# Patient Record
Sex: MALE | Race: WHITE | NOT HISPANIC OR LATINO
[De-identification: names, ages, dates, MRNs, and addresses within clinical notes are randomized per-mention and may not be internally consistent; named-entity substitution may affect disease eponyms.]

---

## 2019-09-26 PROBLEM — Z00.00 ENCOUNTER FOR PREVENTIVE HEALTH EXAMINATION: Status: ACTIVE | Noted: 2019-09-26

## 2019-09-27 ENCOUNTER — APPOINTMENT (OUTPATIENT)
Age: 69
End: 2019-09-27
Payer: MEDICARE

## 2019-09-27 ENCOUNTER — NON-APPOINTMENT (OUTPATIENT)
Age: 69
End: 2019-09-27

## 2019-09-27 PROCEDURE — 92250 FUNDUS PHOTOGRAPHY W/I&R: CPT

## 2019-09-27 PROCEDURE — 92020 GONIOSCOPY: CPT

## 2019-09-27 PROCEDURE — 92083 EXTENDED VISUAL FIELD XM: CPT

## 2019-09-27 PROCEDURE — 92004 COMPRE OPH EXAM NEW PT 1/>: CPT

## 2019-11-14 ENCOUNTER — NON-APPOINTMENT (OUTPATIENT)
Age: 69
End: 2019-11-14

## 2019-11-14 ENCOUNTER — APPOINTMENT (OUTPATIENT)
Dept: OPHTHALMOLOGY | Facility: CLINIC | Age: 69
End: 2019-11-14
Payer: MEDICARE

## 2019-11-14 PROCEDURE — 92133 CPTRZD OPH DX IMG PST SGM ON: CPT

## 2019-11-14 PROCEDURE — 92012 INTRM OPH EXAM EST PATIENT: CPT

## 2019-11-14 PROCEDURE — 76514 ECHO EXAM OF EYE THICKNESS: CPT

## 2020-02-03 ENCOUNTER — APPOINTMENT (OUTPATIENT)
Dept: OPHTHALMOLOGY | Facility: CLINIC | Age: 70
End: 2020-02-03
Payer: MEDICARE

## 2020-02-03 ENCOUNTER — NON-APPOINTMENT (OUTPATIENT)
Age: 70
End: 2020-02-03

## 2020-02-03 PROCEDURE — 92012 INTRM OPH EXAM EST PATIENT: CPT

## 2020-02-03 PROCEDURE — 92083 EXTENDED VISUAL FIELD XM: CPT

## 2020-02-20 ENCOUNTER — APPOINTMENT (OUTPATIENT)
Dept: OPHTHALMOLOGY | Facility: CLINIC | Age: 70
End: 2020-02-20

## 2020-04-30 ENCOUNTER — APPOINTMENT (OUTPATIENT)
Dept: OPHTHALMOLOGY | Facility: CLINIC | Age: 70
End: 2020-04-30

## 2020-05-04 ENCOUNTER — APPOINTMENT (OUTPATIENT)
Age: 70
End: 2020-05-04

## 2020-05-18 ENCOUNTER — APPOINTMENT (OUTPATIENT)
Dept: OPHTHALMOLOGY | Facility: CLINIC | Age: 70
End: 2020-05-18
Payer: MEDICARE

## 2020-05-18 ENCOUNTER — NON-APPOINTMENT (OUTPATIENT)
Age: 70
End: 2020-05-18

## 2020-05-18 PROCEDURE — 99442: CPT

## 2021-01-07 ENCOUNTER — APPOINTMENT (OUTPATIENT)
Dept: OPHTHALMOLOGY | Facility: CLINIC | Age: 71
End: 2021-01-07
Payer: MEDICARE

## 2021-01-07 ENCOUNTER — NON-APPOINTMENT (OUTPATIENT)
Age: 71
End: 2021-01-07

## 2021-01-07 PROCEDURE — 92133 CPTRZD OPH DX IMG PST SGM ON: CPT

## 2021-01-07 PROCEDURE — 92083 EXTENDED VISUAL FIELD XM: CPT

## 2021-01-07 PROCEDURE — 92012 INTRM OPH EXAM EST PATIENT: CPT

## 2021-01-07 PROCEDURE — 99072 ADDL SUPL MATRL&STAF TM PHE: CPT

## 2021-01-14 ENCOUNTER — APPOINTMENT (OUTPATIENT)
Dept: OPHTHALMOLOGY | Facility: CLINIC | Age: 71
End: 2021-01-14
Payer: MEDICARE

## 2021-01-14 ENCOUNTER — NON-APPOINTMENT (OUTPATIENT)
Age: 71
End: 2021-01-14

## 2021-01-14 PROCEDURE — 99072 ADDL SUPL MATRL&STAF TM PHE: CPT

## 2021-01-14 PROCEDURE — 92012 INTRM OPH EXAM EST PATIENT: CPT

## 2021-01-29 ENCOUNTER — APPOINTMENT (OUTPATIENT)
Dept: OPHTHALMOLOGY | Facility: CLINIC | Age: 71
End: 2021-01-29
Payer: MEDICARE

## 2021-01-29 ENCOUNTER — NON-APPOINTMENT (OUTPATIENT)
Age: 71
End: 2021-01-29

## 2021-01-29 PROCEDURE — 92012 INTRM OPH EXAM EST PATIENT: CPT

## 2021-01-29 PROCEDURE — 99072 ADDL SUPL MATRL&STAF TM PHE: CPT

## 2021-02-02 ENCOUNTER — APPOINTMENT (OUTPATIENT)
Dept: OPHTHALMOLOGY | Facility: AMBULATORY SURGERY CENTER | Age: 71
End: 2021-02-02

## 2021-02-03 ENCOUNTER — APPOINTMENT (OUTPATIENT)
Dept: OPHTHALMOLOGY | Facility: CLINIC | Age: 71
End: 2021-02-03

## 2021-03-08 ENCOUNTER — APPOINTMENT (OUTPATIENT)
Dept: OPHTHALMOLOGY | Facility: CLINIC | Age: 71
End: 2021-03-08
Payer: MEDICARE

## 2021-03-08 ENCOUNTER — NON-APPOINTMENT (OUTPATIENT)
Age: 71
End: 2021-03-08

## 2021-03-08 PROCEDURE — 92012 INTRM OPH EXAM EST PATIENT: CPT

## 2021-03-08 PROCEDURE — 99072 ADDL SUPL MATRL&STAF TM PHE: CPT

## 2021-05-07 ENCOUNTER — APPOINTMENT (OUTPATIENT)
Dept: OPHTHALMOLOGY | Facility: CLINIC | Age: 71
End: 2021-05-07

## 2021-08-19 ENCOUNTER — NON-APPOINTMENT (OUTPATIENT)
Age: 71
End: 2021-08-19

## 2021-08-19 ENCOUNTER — APPOINTMENT (OUTPATIENT)
Dept: OPHTHALMOLOGY | Facility: CLINIC | Age: 71
End: 2021-08-19
Payer: MEDICARE

## 2021-08-19 PROCEDURE — 92020 GONIOSCOPY: CPT

## 2021-08-19 PROCEDURE — 92250 FUNDUS PHOTOGRAPHY W/I&R: CPT

## 2021-08-19 PROCEDURE — 92083 EXTENDED VISUAL FIELD XM: CPT

## 2021-08-19 PROCEDURE — 92014 COMPRE OPH EXAM EST PT 1/>: CPT

## 2021-09-24 ENCOUNTER — APPOINTMENT (OUTPATIENT)
Dept: OPHTHALMOLOGY | Facility: CLINIC | Age: 71
End: 2021-09-24
Payer: MEDICARE

## 2021-09-24 ENCOUNTER — NON-APPOINTMENT (OUTPATIENT)
Age: 71
End: 2021-09-24

## 2021-09-24 PROCEDURE — 92012 INTRM OPH EXAM EST PATIENT: CPT

## 2021-11-12 ENCOUNTER — APPOINTMENT (OUTPATIENT)
Dept: OPHTHALMOLOGY | Facility: CLINIC | Age: 71
End: 2021-11-12

## 2021-12-06 ENCOUNTER — NON-APPOINTMENT (OUTPATIENT)
Age: 71
End: 2021-12-06

## 2021-12-06 ENCOUNTER — APPOINTMENT (OUTPATIENT)
Dept: OPHTHALMOLOGY | Facility: CLINIC | Age: 71
End: 2021-12-06
Payer: MEDICARE

## 2021-12-06 PROCEDURE — 92012 INTRM OPH EXAM EST PATIENT: CPT

## 2021-12-06 PROCEDURE — 92083 EXTENDED VISUAL FIELD XM: CPT

## 2021-12-06 PROCEDURE — 92133 CPTRZD OPH DX IMG PST SGM ON: CPT

## 2021-12-16 ENCOUNTER — APPOINTMENT (OUTPATIENT)
Dept: OPHTHALMOLOGY | Facility: CLINIC | Age: 71
End: 2021-12-16

## 2022-03-10 ENCOUNTER — APPOINTMENT (OUTPATIENT)
Dept: OPHTHALMOLOGY | Facility: CLINIC | Age: 72
End: 2022-03-10
Payer: MEDICARE

## 2022-03-10 ENCOUNTER — NON-APPOINTMENT (OUTPATIENT)
Age: 72
End: 2022-03-10

## 2022-03-10 PROCEDURE — 92134 CPTRZ OPH DX IMG PST SGM RTA: CPT

## 2022-03-10 PROCEDURE — 92012 INTRM OPH EXAM EST PATIENT: CPT

## 2022-03-10 PROCEDURE — 92083 EXTENDED VISUAL FIELD XM: CPT

## 2022-07-28 ENCOUNTER — APPOINTMENT (OUTPATIENT)
Dept: OPHTHALMOLOGY | Facility: CLINIC | Age: 72
End: 2022-07-28

## 2022-07-28 ENCOUNTER — NON-APPOINTMENT (OUTPATIENT)
Age: 72
End: 2022-07-28

## 2022-07-28 PROCEDURE — 92083 EXTENDED VISUAL FIELD XM: CPT

## 2022-07-28 PROCEDURE — 92014 COMPRE OPH EXAM EST PT 1/>: CPT

## 2022-07-28 PROCEDURE — 92250 FUNDUS PHOTOGRAPHY W/I&R: CPT

## 2022-12-19 ENCOUNTER — NON-APPOINTMENT (OUTPATIENT)
Age: 72
End: 2022-12-19

## 2022-12-19 ENCOUNTER — APPOINTMENT (OUTPATIENT)
Dept: OPHTHALMOLOGY | Facility: CLINIC | Age: 72
End: 2022-12-19
Payer: MEDICARE

## 2022-12-19 PROCEDURE — 92012 INTRM OPH EXAM EST PATIENT: CPT

## 2022-12-19 PROCEDURE — 92133 CPTRZD OPH DX IMG PST SGM ON: CPT

## 2023-05-01 ENCOUNTER — NON-APPOINTMENT (OUTPATIENT)
Age: 73
End: 2023-05-01

## 2023-05-01 ENCOUNTER — APPOINTMENT (OUTPATIENT)
Dept: OPHTHALMOLOGY | Facility: CLINIC | Age: 73
End: 2023-05-01
Payer: MEDICARE

## 2023-05-01 PROCEDURE — 92012 INTRM OPH EXAM EST PATIENT: CPT

## 2023-05-01 PROCEDURE — 92083 EXTENDED VISUAL FIELD XM: CPT

## 2023-05-01 PROCEDURE — 92133 CPTRZD OPH DX IMG PST SGM ON: CPT

## 2023-09-11 ENCOUNTER — NON-APPOINTMENT (OUTPATIENT)
Age: 73
End: 2023-09-11

## 2023-09-11 ENCOUNTER — APPOINTMENT (OUTPATIENT)
Dept: OPHTHALMOLOGY | Facility: CLINIC | Age: 73
End: 2023-09-11
Payer: MEDICARE

## 2023-09-11 PROCEDURE — 92012 INTRM OPH EXAM EST PATIENT: CPT

## 2023-09-11 PROCEDURE — 92083 EXTENDED VISUAL FIELD XM: CPT

## 2023-12-19 VITALS
RESPIRATION RATE: 16 BRPM | DIASTOLIC BLOOD PRESSURE: 65 MMHG | OXYGEN SATURATION: 100 % | HEIGHT: 64 IN | WEIGHT: 130.07 LBS | TEMPERATURE: 98 F | SYSTOLIC BLOOD PRESSURE: 149 MMHG | HEART RATE: 54 BPM

## 2023-12-19 RX ORDER — DEXTROSE 50 % IN WATER 50 %
25 SYRINGE (ML) INTRAVENOUS ONCE
Refills: 0 | Status: DISCONTINUED | OUTPATIENT
Start: 2023-12-20 | End: 2024-01-03

## 2023-12-19 RX ORDER — DEXTROSE 50 % IN WATER 50 %
12.5 SYRINGE (ML) INTRAVENOUS ONCE
Refills: 0 | Status: DISCONTINUED | OUTPATIENT
Start: 2023-12-20 | End: 2024-01-03

## 2023-12-19 RX ORDER — SODIUM CHLORIDE 9 MG/ML
1000 INJECTION, SOLUTION INTRAVENOUS
Refills: 0 | Status: DISCONTINUED | OUTPATIENT
Start: 2023-12-20 | End: 2024-01-03

## 2023-12-19 RX ORDER — DEXTROSE 50 % IN WATER 50 %
15 SYRINGE (ML) INTRAVENOUS ONCE
Refills: 0 | Status: DISCONTINUED | OUTPATIENT
Start: 2023-12-20 | End: 2024-01-03

## 2023-12-19 RX ORDER — INSULIN LISPRO 100/ML
VIAL (ML) SUBCUTANEOUS
Refills: 0 | Status: DISCONTINUED | OUTPATIENT
Start: 2023-12-20 | End: 2024-01-03

## 2023-12-19 RX ORDER — GLUCAGON INJECTION, SOLUTION 0.5 MG/.1ML
1 INJECTION, SOLUTION SUBCUTANEOUS ONCE
Refills: 0 | Status: DISCONTINUED | OUTPATIENT
Start: 2023-12-20 | End: 2024-01-03

## 2023-12-19 NOTE — H&P ADULT - NSHPLABSRESULTS_GEN_ALL_CORE
12.8   6.83  )-----------( 224      ( 20 Dec 2023 07:06 )             38.2       12-20    133<L>  |  99  |  28<H>  ----------------------------<  107<H>  3.4<L>   |  25  |  1.06    Ca    9.5      20 Dec 2023 07:06  Mg     1.8     12-20    TPro  7.3  /  Alb  4.4  /  TBili  0.4  /  DBili  x   /  AST  20  /  ALT  15  /  AlkPhos  100  12-20      PT/INR - ( 20 Dec 2023 07:06 )   PT: 11.9 sec;   INR: 1.05          PTT - ( 20 Dec 2023 07:06 )  PTT:33.0 sec    CARDIAC MARKERS ( 20 Dec 2023 07:06 )  x     / x     / 100 U/L / x     / 3.5 ng/mL            EKG: SB at 54 bpm with 1st degree AV block. RBBB. TWI III.

## 2023-12-19 NOTE — H&P ADULT - ASSESSMENT
73 year old Azeri/English speaking M with PMHx HTN, Hyperlipidemia, DM Type II, PVD (on Xarelto 2.5 mg PO BID-last dose 12/19/23), RBBB, Multilevel Spondylisis with left arm weakness, Bilateral Carotid Artery Stenosis (severe left and mdoerate right) s/p Left CEA 2/2017, CAD s/p 2V CABG (LIMA-LAD and SVG-OM1) ~ 2006 with subsequent PCI pLAD due to occlusion LIMA-LAD, SKYLER (on CPAP) who presents for cardiac cath due to patient's risk factors, CCS Angina Class III Symptoms and abnormal NST.    ASA III          Mallampati III  Patient is a candidate for sedation: yes  Sedation: Moderate    Risks & benefits of procedure and alternative therapy have been explained to the patient including but not limited to: allergic reaction, bleeding w/possible need for blood transfusion, infection, renal and vascular compromise, limb damage, arrhythmia, stroke, vessel dissection/perforation, Myocardial infarction, emergent CABG. Informed consent obtained and in chart.   Consent obtained using Azeri  [employer] ID #[id number]    Hgb/HCT 12.8/38.2 consistent with prior outpatient labs December 2023. Patient denies bleeding, BRBPR, melena,  hematemesis. Patient reports hematuria 2-3 weeks ago where he was treated at [facility] and Hematuria has resolved ? UTI. Patient us currently on Aspirin 81 mg daily and reports compliance last dose 12/19/23. ASA EC 81 mg PO x 1 and Plavix 600 mg PO x 1 given prior to procedure. Xarelto 2.5 mg last dose 12/19/23  aware. INR 1.3     EF normal by echo 10/25/23. Patient euvolemic on exam with no history of CHF.  cc Bolus IV x 1 followed by NS 75 cc/hr x 2 hrs per Hydration Protocol.     K +3.4 Kdur 40 mEq PO x 1 given prior to procedure. Magnesium 1.8-Mag Ox 800 mg PO x 1 given prior to procedure.    73 year old Japanese/English speaking M with PMHx HTN, Hyperlipidemia, DM Type II, PVD (on Xarelto 2.5 mg PO BID-last dose 12/19/23), RBBB, Multilevel Spondylisis with left arm weakness, Bilateral Carotid Artery Stenosis (severe left and mdoerate right) s/p Left CEA 2/2017, CAD s/p 2V CABG (LIMA-LAD and SVG-OM1) ~ 2006 with subsequent PCI pLAD due to occlusion LIMA-LAD, SKYLER (on CPAP) who presents for cardiac cath due to patient's risk factors, CCS Angina Class III Symptoms and abnormal NST.    ASA III          Mallampati III  Patient is a candidate for sedation: yes  Sedation: Moderate    Risks & benefits of procedure and alternative therapy have been explained to the patient including but not limited to: allergic reaction, bleeding w/possible need for blood transfusion, infection, renal and vascular compromise, limb damage, arrhythmia, stroke, vessel dissection/perforation, Myocardial infarction, emergent CABG. Informed consent obtained and in chart.   Consent obtained using Japanese  [employer] ID #[id number]    Hgb/HCT 12.8/38.2 consistent with prior outpatient labs December 2023. Patient denies bleeding, BRBPR, melena,  hematemesis. Patient reports hematuria 2-3 weeks ago where he was treated at [facility] and Hematuria has resolved ? UTI. Patient us currently on Aspirin 81 mg daily and reports compliance last dose 12/19/23. ASA EC 81 mg PO x 1 and Plavix 600 mg PO x 1 given prior to procedure. Xarelto 2.5 mg last dose 12/19/23  aware. INR 1.3     EF normal by echo 10/25/23. Patient euvolemic on exam with no history of CHF.  cc Bolus IV x 1 followed by NS 75 cc/hr x 2 hrs per Hydration Protocol.     K +3.4 Kdur 40 mEq PO x 1 given prior to procedure. Magnesium 1.8-Mag Ox 800 mg PO x 1 given prior to procedure.

## 2023-12-19 NOTE — H&P ADULT - NSICDXPASTMEDICALHX_GEN_ALL_CORE_FT
PAST MEDICAL HISTORY:  CAD (coronary artery disease)     Carotid artery stenosis     Hyperlipidemia     Hypertension     Type 2 diabetes mellitus

## 2023-12-19 NOTE — H&P ADULT - VTE RISK ASSESSMENT
Roderick Galloway 
VTE Assessment already completed for this visit

## 2023-12-19 NOTE — H&P ADULT - HISTORY OF PRESENT ILLNESS
Cardiologist: Dr. Zia Uriarte   Pharmacy:    Escort:     Confirm meds    73 year old M with PMHx HTN, Hyperlipidemia, DM Type II, PVD (on Xarelto 2.5 mg PO BID), RBBB, Multilevel Spondylisis with left arm weakness, Bilateral Carotid Artery Stenosis (severe left and mdoerate right) s/p Left CEA 2/2017, CAD s/p 2V CABG (LIMA-LAD and SVG-OM1) ~ 2006 with subsequent PCI pLAD due to occlusion LIMA-LAD, SKYLER (on CPAP) who presented to cardiologist c/o intermittent non-radiating substernal exertional chest pressure x few weeks. C/P has been gradually worsening and symptoms occur when walking less than 2 blocks or climbing less than 1 flight of stairs (CCS Angina Class III Symptoms). He also reports dizziness and claudication with 1-3 blocks or climbing 1-2 flights of stairs. Patient denies SOB, palpitations, diaphoresis, N/V, syncope, LE edema, PND or orthopnea, fever, chills, cough, BRBPR, melena. Nuclear Stress Test 10/28/23 revealed small to moderate amount of ischemia in the inferior region , post stress LVEF 72%, normal wall motion, no TID. Stress EKG negative.      Echocardiogram 10/25/23 revealed LVEF 60%, normal wall motion, mild MR, no pulmonary HTN, no pericardial effusion. In light of patient’s risk factors, CCS Angina Class III Symptoms, known CAD and abnormal NST patient now presents for cardiac cath with possible intervention if clinically indicated. Cardiologist: Dr. Zia Uriarte   Pharmacy:  [facility] (in system)-Medications obtained from medication bottles and Surescripts  Escort: Son    73 year old Welsh/English speaking M with PMHx HTN, Hyperlipidemia, DM Type II, PVD (on Xarelto 2.5 mg PO BID--last dose 12/19/23), RBBB, Multilevel Spondylisis with left arm weakness, Bilateral Carotid Artery Stenosis (severe left and mdoerate right) s/p Left CEA 2/2017, CAD s/p 2V CABG (LIMA-LAD and SVG-OM1) ~ 2006 with subsequent PCI pLAD due to occlusion LIMA-LAD, SKYLER (on CPAP) who presented to cardiologist c/o intermittent non-radiating substernal exertional chest pressure x few weeks. C/P has been gradually worsening and symptoms occur when walking less than 2 blocks or climbing less than 1 flight of stairs (CCS Angina Class III Symptoms). He also reports dizziness and claudication with 1-3 blocks or climbing 1-2 flights of stairs. Patient denies SOB, palpitations, diaphoresis, N/V, syncope, LE edema, PND or orthopnea, fever, chills, cough, BRBPR, melena. Nuclear Stress Test 10/28/23 revealed small to moderate amount of ischemia in the inferior region , post stress LVEF 72%, normal wall motion, no TID. Stress EKG negative.      Echocardiogram 10/25/23 revealed LVEF 60%, normal wall motion, mild MR, no pulmonary HTN, no pericardial effusion. In light of patient’s risk factors, CCS Angina Class III Symptoms, known CAD and abnormal NST patient now presents for cardiac cath with possible intervention if clinically indicated. Cardiologist: Dr. Zia Uriarte   Pharmacy:  [facility] (in system)-Medications obtained from medication bottles and Surescripts  Escort: Son    73 year old Georgian/English speaking M with PMHx HTN, Hyperlipidemia, DM Type II, PVD (on Xarelto 2.5 mg PO BID--last dose 12/19/23), RBBB, Multilevel Spondylisis with left arm weakness, Bilateral Carotid Artery Stenosis (severe left and mdoerate right) s/p Left CEA 2/2017, CAD s/p 2V CABG (LIMA-LAD and SVG-OM1) ~ 2006 with subsequent PCI pLAD due to occlusion LIMA-LAD, SKYLER (on CPAP) who presented to cardiologist c/o intermittent non-radiating substernal exertional chest pressure x few weeks. C/P has been gradually worsening and symptoms occur when walking less than 2 blocks or climbing less than 1 flight of stairs (CCS Angina Class III Symptoms). He also reports dizziness and claudication with 1-3 blocks or climbing 1-2 flights of stairs. Patient denies SOB, palpitations, diaphoresis, N/V, syncope, LE edema, PND or orthopnea, fever, chills, cough, BRBPR, melena. Nuclear Stress Test 10/28/23 revealed small to moderate amount of ischemia in the inferior region , post stress LVEF 72%, normal wall motion, no TID. Stress EKG negative.      Echocardiogram 10/25/23 revealed LVEF 60%, normal wall motion, mild MR, no pulmonary HTN, no pericardial effusion. In light of patient’s risk factors, CCS Angina Class III Symptoms, known CAD and abnormal NST patient now presents for cardiac cath with possible intervention if clinically indicated.

## 2023-12-20 ENCOUNTER — OUTPATIENT (OUTPATIENT)
Dept: OUTPATIENT SERVICES | Facility: HOSPITAL | Age: 73
LOS: 1 days | Discharge: HOME | End: 2023-12-20
Payer: MEDICARE

## 2023-12-20 LAB
A1C WITH ESTIMATED AVERAGE GLUCOSE RESULT: 7.1 % — HIGH (ref 4–5.6)
A1C WITH ESTIMATED AVERAGE GLUCOSE RESULT: 7.1 % — HIGH (ref 4–5.6)
ALBUMIN SERPL ELPH-MCNC: 4.4 G/DL — SIGNIFICANT CHANGE UP (ref 3.3–5)
ALBUMIN SERPL ELPH-MCNC: 4.4 G/DL — SIGNIFICANT CHANGE UP (ref 3.3–5)
ALP SERPL-CCNC: 100 U/L — SIGNIFICANT CHANGE UP (ref 40–120)
ALP SERPL-CCNC: 100 U/L — SIGNIFICANT CHANGE UP (ref 40–120)
ALT FLD-CCNC: 15 U/L — SIGNIFICANT CHANGE UP (ref 10–45)
ALT FLD-CCNC: 15 U/L — SIGNIFICANT CHANGE UP (ref 10–45)
ANION GAP SERPL CALC-SCNC: 10 MMOL/L — SIGNIFICANT CHANGE UP (ref 5–17)
ANION GAP SERPL CALC-SCNC: 10 MMOL/L — SIGNIFICANT CHANGE UP (ref 5–17)
ANION GAP SERPL CALC-SCNC: 9 MMOL/L — SIGNIFICANT CHANGE UP (ref 5–17)
ANION GAP SERPL CALC-SCNC: 9 MMOL/L — SIGNIFICANT CHANGE UP (ref 5–17)
APTT BLD: 33 SEC — SIGNIFICANT CHANGE UP (ref 24.5–35.6)
APTT BLD: 33 SEC — SIGNIFICANT CHANGE UP (ref 24.5–35.6)
AST SERPL-CCNC: 20 U/L — SIGNIFICANT CHANGE UP (ref 10–40)
AST SERPL-CCNC: 20 U/L — SIGNIFICANT CHANGE UP (ref 10–40)
BASOPHILS # BLD AUTO: 0.07 K/UL — SIGNIFICANT CHANGE UP (ref 0–0.2)
BASOPHILS # BLD AUTO: 0.07 K/UL — SIGNIFICANT CHANGE UP (ref 0–0.2)
BASOPHILS NFR BLD AUTO: 1 % — SIGNIFICANT CHANGE UP (ref 0–2)
BASOPHILS NFR BLD AUTO: 1 % — SIGNIFICANT CHANGE UP (ref 0–2)
BILIRUB SERPL-MCNC: 0.4 MG/DL — SIGNIFICANT CHANGE UP (ref 0.2–1.2)
BILIRUB SERPL-MCNC: 0.4 MG/DL — SIGNIFICANT CHANGE UP (ref 0.2–1.2)
BLOOD GAS VENOUS - CREATININE: 1.1 MG/DL — SIGNIFICANT CHANGE UP (ref 0.5–1.3)
BUN SERPL-MCNC: 24 MG/DL — HIGH (ref 7–23)
BUN SERPL-MCNC: 24 MG/DL — HIGH (ref 7–23)
BUN SERPL-MCNC: 28 MG/DL — HIGH (ref 7–23)
BUN SERPL-MCNC: 28 MG/DL — HIGH (ref 7–23)
CA-I SERPL-SCNC: 1.25 MMOL/L — SIGNIFICANT CHANGE UP (ref 1.15–1.33)
CALCIUM SERPL-MCNC: 8.5 MG/DL — SIGNIFICANT CHANGE UP (ref 8.4–10.5)
CALCIUM SERPL-MCNC: 8.5 MG/DL — SIGNIFICANT CHANGE UP (ref 8.4–10.5)
CALCIUM SERPL-MCNC: 9.5 MG/DL — SIGNIFICANT CHANGE UP (ref 8.4–10.5)
CALCIUM SERPL-MCNC: 9.5 MG/DL — SIGNIFICANT CHANGE UP (ref 8.4–10.5)
CHLORIDE BLDV-SCNC: 102 MMOL/L — SIGNIFICANT CHANGE UP (ref 96–108)
CHLORIDE SERPL-SCNC: 103 MMOL/L — SIGNIFICANT CHANGE UP (ref 96–108)
CHLORIDE SERPL-SCNC: 103 MMOL/L — SIGNIFICANT CHANGE UP (ref 96–108)
CHLORIDE SERPL-SCNC: 99 MMOL/L — SIGNIFICANT CHANGE UP (ref 96–108)
CHLORIDE SERPL-SCNC: 99 MMOL/L — SIGNIFICANT CHANGE UP (ref 96–108)
CHOLEST SERPL-MCNC: 152 MG/DL — SIGNIFICANT CHANGE UP
CHOLEST SERPL-MCNC: 152 MG/DL — SIGNIFICANT CHANGE UP
CK MB CFR SERPL CALC: 3.5 NG/ML — SIGNIFICANT CHANGE UP (ref 0–6.7)
CK MB CFR SERPL CALC: 3.5 NG/ML — SIGNIFICANT CHANGE UP (ref 0–6.7)
CK SERPL-CCNC: 100 U/L — SIGNIFICANT CHANGE UP (ref 30–200)
CK SERPL-CCNC: 100 U/L — SIGNIFICANT CHANGE UP (ref 30–200)
CO2 BLDV-SCNC: 22.2 MMOL/L — SIGNIFICANT CHANGE UP (ref 22–26)
CO2 SERPL-SCNC: 20 MMOL/L — LOW (ref 22–31)
CO2 SERPL-SCNC: 20 MMOL/L — LOW (ref 22–31)
CO2 SERPL-SCNC: 25 MMOL/L — SIGNIFICANT CHANGE UP (ref 22–31)
CO2 SERPL-SCNC: 25 MMOL/L — SIGNIFICANT CHANGE UP (ref 22–31)
CREAT SERPL-MCNC: 0.9 MG/DL — SIGNIFICANT CHANGE UP (ref 0.5–1.3)
CREAT SERPL-MCNC: 0.9 MG/DL — SIGNIFICANT CHANGE UP (ref 0.5–1.3)
CREAT SERPL-MCNC: 1.06 MG/DL — SIGNIFICANT CHANGE UP (ref 0.5–1.3)
CREAT SERPL-MCNC: 1.06 MG/DL — SIGNIFICANT CHANGE UP (ref 0.5–1.3)
EGFR: 75 ML/MIN/1.73M2 — SIGNIFICANT CHANGE UP
EGFR: 75 ML/MIN/1.73M2 — SIGNIFICANT CHANGE UP
EGFR: 91 ML/MIN/1.73M2 — SIGNIFICANT CHANGE UP
EGFR: 91 ML/MIN/1.73M2 — SIGNIFICANT CHANGE UP
EOSINOPHIL # BLD AUTO: 0.43 K/UL — SIGNIFICANT CHANGE UP (ref 0–0.5)
EOSINOPHIL # BLD AUTO: 0.43 K/UL — SIGNIFICANT CHANGE UP (ref 0–0.5)
EOSINOPHIL NFR BLD AUTO: 6.3 % — HIGH (ref 0–6)
EOSINOPHIL NFR BLD AUTO: 6.3 % — HIGH (ref 0–6)
ESTIMATED AVERAGE GLUCOSE: 157 MG/DL — HIGH (ref 68–114)
ESTIMATED AVERAGE GLUCOSE: 157 MG/DL — HIGH (ref 68–114)
GLUCOSE BLDC GLUCOMTR-MCNC: 104 MG/DL — HIGH (ref 70–99)
GLUCOSE BLDC GLUCOMTR-MCNC: 104 MG/DL — HIGH (ref 70–99)
GLUCOSE BLDC GLUCOMTR-MCNC: 97 MG/DL — SIGNIFICANT CHANGE UP (ref 70–99)
GLUCOSE BLDC GLUCOMTR-MCNC: 97 MG/DL — SIGNIFICANT CHANGE UP (ref 70–99)
GLUCOSE BLDV-MCNC: 111 MG/DL — HIGH (ref 70–99)
GLUCOSE SERPL-MCNC: 107 MG/DL — HIGH (ref 70–99)
GLUCOSE SERPL-MCNC: 107 MG/DL — HIGH (ref 70–99)
GLUCOSE SERPL-MCNC: 144 MG/DL — HIGH (ref 70–99)
GLUCOSE SERPL-MCNC: 144 MG/DL — HIGH (ref 70–99)
HCT VFR BLD CALC: 34.5 % — LOW (ref 39–50)
HCT VFR BLD CALC: 34.5 % — LOW (ref 39–50)
HCT VFR BLD CALC: 38.2 % — LOW (ref 39–50)
HCT VFR BLD CALC: 38.2 % — LOW (ref 39–50)
HDLC SERPL-MCNC: 63 MG/DL — SIGNIFICANT CHANGE UP
HDLC SERPL-MCNC: 63 MG/DL — SIGNIFICANT CHANGE UP
HGB BLD-MCNC: 11.7 G/DL — LOW (ref 13–17)
HGB BLD-MCNC: 11.7 G/DL — LOW (ref 13–17)
HGB BLD-MCNC: 12.8 G/DL — LOW (ref 13–17)
HGB BLD-MCNC: 12.8 G/DL — LOW (ref 13–17)
IMM GRANULOCYTES NFR BLD AUTO: 0.1 % — SIGNIFICANT CHANGE UP (ref 0–0.9)
IMM GRANULOCYTES NFR BLD AUTO: 0.1 % — SIGNIFICANT CHANGE UP (ref 0–0.9)
INR BLD: 1.05 — SIGNIFICANT CHANGE UP (ref 0.85–1.18)
INR BLD: 1.05 — SIGNIFICANT CHANGE UP (ref 0.85–1.18)
ISTAT ACTK (ACTIVATED CLOTTING TIME KAOLIN): 277 SEC — HIGH (ref 74–137)
ISTAT ACTK (ACTIVATED CLOTTING TIME KAOLIN): 277 SEC — HIGH (ref 74–137)
ISTAT INR: 1.3 — HIGH (ref 0.88–1.16)
ISTAT INR: 1.3 — HIGH (ref 0.88–1.16)
ISTAT PT: 15.6 SEC — HIGH (ref 10–12.9)
ISTAT PT: 15.6 SEC — HIGH (ref 10–12.9)
LACTATE BLDV-MCNC: 1.4 MMOL/L — SIGNIFICANT CHANGE UP (ref 0.5–2)
LIPID PNL WITH DIRECT LDL SERPL: 54 MG/DL — SIGNIFICANT CHANGE UP
LIPID PNL WITH DIRECT LDL SERPL: 54 MG/DL — SIGNIFICANT CHANGE UP
LYMPHOCYTES # BLD AUTO: 1.59 K/UL — SIGNIFICANT CHANGE UP (ref 1–3.3)
LYMPHOCYTES # BLD AUTO: 1.59 K/UL — SIGNIFICANT CHANGE UP (ref 1–3.3)
LYMPHOCYTES # BLD AUTO: 23.3 % — SIGNIFICANT CHANGE UP (ref 13–44)
LYMPHOCYTES # BLD AUTO: 23.3 % — SIGNIFICANT CHANGE UP (ref 13–44)
MAGNESIUM SERPL-MCNC: 1.8 MG/DL — SIGNIFICANT CHANGE UP (ref 1.6–2.6)
MCHC RBC-ENTMCNC: 27.9 PG — SIGNIFICANT CHANGE UP (ref 27–34)
MCHC RBC-ENTMCNC: 27.9 PG — SIGNIFICANT CHANGE UP (ref 27–34)
MCHC RBC-ENTMCNC: 28.3 PG — SIGNIFICANT CHANGE UP (ref 27–34)
MCHC RBC-ENTMCNC: 28.3 PG — SIGNIFICANT CHANGE UP (ref 27–34)
MCHC RBC-ENTMCNC: 33.5 GM/DL — SIGNIFICANT CHANGE UP (ref 32–36)
MCHC RBC-ENTMCNC: 33.5 GM/DL — SIGNIFICANT CHANGE UP (ref 32–36)
MCHC RBC-ENTMCNC: 33.9 GM/DL — SIGNIFICANT CHANGE UP (ref 32–36)
MCHC RBC-ENTMCNC: 33.9 GM/DL — SIGNIFICANT CHANGE UP (ref 32–36)
MCV RBC AUTO: 83.3 FL — SIGNIFICANT CHANGE UP (ref 80–100)
MCV RBC AUTO: 83.3 FL — SIGNIFICANT CHANGE UP (ref 80–100)
MCV RBC AUTO: 83.4 FL — SIGNIFICANT CHANGE UP (ref 80–100)
MCV RBC AUTO: 83.4 FL — SIGNIFICANT CHANGE UP (ref 80–100)
MONOCYTES # BLD AUTO: 0.7 K/UL — SIGNIFICANT CHANGE UP (ref 0–0.9)
MONOCYTES # BLD AUTO: 0.7 K/UL — SIGNIFICANT CHANGE UP (ref 0–0.9)
MONOCYTES NFR BLD AUTO: 10.2 % — SIGNIFICANT CHANGE UP (ref 2–14)
MONOCYTES NFR BLD AUTO: 10.2 % — SIGNIFICANT CHANGE UP (ref 2–14)
NEUTROPHILS # BLD AUTO: 4.03 K/UL — SIGNIFICANT CHANGE UP (ref 1.8–7.4)
NEUTROPHILS # BLD AUTO: 4.03 K/UL — SIGNIFICANT CHANGE UP (ref 1.8–7.4)
NEUTROPHILS NFR BLD AUTO: 59.1 % — SIGNIFICANT CHANGE UP (ref 43–77)
NEUTROPHILS NFR BLD AUTO: 59.1 % — SIGNIFICANT CHANGE UP (ref 43–77)
NON HDL CHOLESTEROL: 89 MG/DL — SIGNIFICANT CHANGE UP
NON HDL CHOLESTEROL: 89 MG/DL — SIGNIFICANT CHANGE UP
NRBC # BLD: 0 /100 WBCS — SIGNIFICANT CHANGE UP (ref 0–0)
PCO2 BLDV: 51 MMHG — SIGNIFICANT CHANGE UP (ref 42–55)
PH BLDV: 7.33 — SIGNIFICANT CHANGE UP (ref 7.32–7.43)
PLATELET # BLD AUTO: 197 K/UL — SIGNIFICANT CHANGE UP (ref 150–400)
PLATELET # BLD AUTO: 197 K/UL — SIGNIFICANT CHANGE UP (ref 150–400)
PLATELET # BLD AUTO: 224 K/UL — SIGNIFICANT CHANGE UP (ref 150–400)
PLATELET # BLD AUTO: 224 K/UL — SIGNIFICANT CHANGE UP (ref 150–400)
POTASSIUM BLDV-SCNC: 3.7 MMOL/L — SIGNIFICANT CHANGE UP (ref 3.5–5.1)
POTASSIUM SERPL-MCNC: 3.4 MMOL/L — LOW (ref 3.5–5.3)
POTASSIUM SERPL-MCNC: 3.4 MMOL/L — LOW (ref 3.5–5.3)
POTASSIUM SERPL-MCNC: 4.2 MMOL/L — SIGNIFICANT CHANGE UP (ref 3.5–5.3)
POTASSIUM SERPL-MCNC: 4.2 MMOL/L — SIGNIFICANT CHANGE UP (ref 3.5–5.3)
POTASSIUM SERPL-SCNC: 3.4 MMOL/L — LOW (ref 3.5–5.3)
POTASSIUM SERPL-SCNC: 3.4 MMOL/L — LOW (ref 3.5–5.3)
POTASSIUM SERPL-SCNC: 4.2 MMOL/L — SIGNIFICANT CHANGE UP (ref 3.5–5.3)
POTASSIUM SERPL-SCNC: 4.2 MMOL/L — SIGNIFICANT CHANGE UP (ref 3.5–5.3)
PROT SERPL-MCNC: 7.3 G/DL — SIGNIFICANT CHANGE UP (ref 6–8.3)
PROT SERPL-MCNC: 7.3 G/DL — SIGNIFICANT CHANGE UP (ref 6–8.3)
PROTHROM AB SERPL-ACNC: 11.9 SEC — SIGNIFICANT CHANGE UP (ref 9.5–13)
PROTHROM AB SERPL-ACNC: 11.9 SEC — SIGNIFICANT CHANGE UP (ref 9.5–13)
RBC # BLD: 4.14 M/UL — LOW (ref 4.2–5.8)
RBC # BLD: 4.14 M/UL — LOW (ref 4.2–5.8)
RBC # BLD: 4.58 M/UL — SIGNIFICANT CHANGE UP (ref 4.2–5.8)
RBC # BLD: 4.58 M/UL — SIGNIFICANT CHANGE UP (ref 4.2–5.8)
RBC # FLD: 13.5 % — SIGNIFICANT CHANGE UP (ref 10.3–14.5)
RBC # FLD: 13.5 % — SIGNIFICANT CHANGE UP (ref 10.3–14.5)
RBC # FLD: 13.6 % — SIGNIFICANT CHANGE UP (ref 10.3–14.5)
RBC # FLD: 13.6 % — SIGNIFICANT CHANGE UP (ref 10.3–14.5)
SODIUM SERPL-SCNC: 133 MMOL/L — LOW (ref 135–145)
TRIGL SERPL-MCNC: 176 MG/DL — HIGH
TRIGL SERPL-MCNC: 176 MG/DL — HIGH
WBC # BLD: 5.02 K/UL — SIGNIFICANT CHANGE UP (ref 3.8–10.5)
WBC # BLD: 5.02 K/UL — SIGNIFICANT CHANGE UP (ref 3.8–10.5)
WBC # BLD: 6.83 K/UL — SIGNIFICANT CHANGE UP (ref 3.8–10.5)
WBC # BLD: 6.83 K/UL — SIGNIFICANT CHANGE UP (ref 3.8–10.5)
WBC # FLD AUTO: 5.02 K/UL — SIGNIFICANT CHANGE UP (ref 3.8–10.5)
WBC # FLD AUTO: 5.02 K/UL — SIGNIFICANT CHANGE UP (ref 3.8–10.5)
WBC # FLD AUTO: 6.83 K/UL — SIGNIFICANT CHANGE UP (ref 3.8–10.5)
WBC # FLD AUTO: 6.83 K/UL — SIGNIFICANT CHANGE UP (ref 3.8–10.5)

## 2023-12-20 PROCEDURE — 99152 MOD SED SAME PHYS/QHP 5/>YRS: CPT

## 2023-12-20 PROCEDURE — 85025 COMPLETE CBC W/AUTO DIFF WBC: CPT

## 2023-12-20 PROCEDURE — C1753: CPT

## 2023-12-20 PROCEDURE — C1874: CPT

## 2023-12-20 PROCEDURE — 82565 ASSAY OF CREATININE: CPT

## 2023-12-20 PROCEDURE — 85027 COMPLETE CBC AUTOMATED: CPT

## 2023-12-20 PROCEDURE — 85347 COAGULATION TIME ACTIVATED: CPT

## 2023-12-20 PROCEDURE — 80048 BASIC METABOLIC PNL TOTAL CA: CPT

## 2023-12-20 PROCEDURE — 82962 GLUCOSE BLOOD TEST: CPT

## 2023-12-20 PROCEDURE — 93005 ELECTROCARDIOGRAM TRACING: CPT

## 2023-12-20 PROCEDURE — 93459 L HRT ART/GRFT ANGIO: CPT | Mod: 26,59

## 2023-12-20 PROCEDURE — 80053 COMPREHEN METABOLIC PANEL: CPT

## 2023-12-20 PROCEDURE — 92978 ENDOLUMINL IVUS OCT C 1ST: CPT | Mod: 26,LD

## 2023-12-20 PROCEDURE — 36415 COLL VENOUS BLD VENIPUNCTURE: CPT

## 2023-12-20 PROCEDURE — 93459 L HRT ART/GRFT ANGIO: CPT | Mod: 59

## 2023-12-20 PROCEDURE — 80061 LIPID PANEL: CPT

## 2023-12-20 PROCEDURE — C9600: CPT | Mod: LD

## 2023-12-20 PROCEDURE — C1769: CPT

## 2023-12-20 PROCEDURE — 83036 HEMOGLOBIN GLYCOSYLATED A1C: CPT

## 2023-12-20 PROCEDURE — C1894: CPT

## 2023-12-20 PROCEDURE — C1887: CPT

## 2023-12-20 PROCEDURE — 82553 CREATINE MB FRACTION: CPT

## 2023-12-20 PROCEDURE — 93010 ELECTROCARDIOGRAM REPORT: CPT | Mod: 77

## 2023-12-20 PROCEDURE — 92928 PRQ TCAT PLMT NTRAC ST 1 LES: CPT | Mod: LD

## 2023-12-20 PROCEDURE — C1725: CPT

## 2023-12-20 PROCEDURE — 82550 ASSAY OF CK (CPK): CPT

## 2023-12-20 PROCEDURE — 99153 MOD SED SAME PHYS/QHP EA: CPT

## 2023-12-20 PROCEDURE — 85730 THROMBOPLASTIN TIME PARTIAL: CPT

## 2023-12-20 PROCEDURE — 83735 ASSAY OF MAGNESIUM: CPT

## 2023-12-20 PROCEDURE — 92978 ENDOLUMINL IVUS OCT C 1ST: CPT | Mod: LD

## 2023-12-20 PROCEDURE — 93010 ELECTROCARDIOGRAM REPORT: CPT

## 2023-12-20 PROCEDURE — 85610 PROTHROMBIN TIME: CPT

## 2023-12-20 RX ORDER — CLOPIDOGREL BISULFATE 75 MG/1
1 TABLET, FILM COATED ORAL
Qty: 30 | Refills: 11
Start: 2023-12-20 | End: 2024-12-13

## 2023-12-20 RX ORDER — NETARSUDIL AND LATANOPROST OPHTHALMIC SOLUTION, 0.02%/0.005% .2; .05 MG/ML; MG/ML
1 SOLUTION/ DROPS OPHTHALMIC; TOPICAL
Refills: 0 | DISCHARGE

## 2023-12-20 RX ORDER — SODIUM CHLORIDE 9 MG/ML
250 INJECTION INTRAMUSCULAR; INTRAVENOUS; SUBCUTANEOUS ONCE
Refills: 0 | Status: COMPLETED | OUTPATIENT
Start: 2023-12-20 | End: 2023-12-20

## 2023-12-20 RX ORDER — POTASSIUM CHLORIDE 20 MEQ
40 PACKET (EA) ORAL ONCE
Refills: 0 | Status: COMPLETED | OUTPATIENT
Start: 2023-12-20 | End: 2023-12-20

## 2023-12-20 RX ORDER — ATORVASTATIN CALCIUM 80 MG/1
1 TABLET, FILM COATED ORAL
Qty: 30 | Refills: 2
Start: 2023-12-20 | End: 2024-03-18

## 2023-12-20 RX ORDER — CLOPIDOGREL BISULFATE 75 MG/1
600 TABLET, FILM COATED ORAL ONCE
Refills: 0 | Status: COMPLETED | OUTPATIENT
Start: 2023-12-20 | End: 2023-12-20

## 2023-12-20 RX ORDER — CHLORHEXIDINE GLUCONATE 213 G/1000ML
1 SOLUTION TOPICAL ONCE
Refills: 0 | Status: DISCONTINUED | OUTPATIENT
Start: 2023-12-20 | End: 2023-12-20

## 2023-12-20 RX ORDER — ICOSAPENT ETHYL 500 MG/1
2 CAPSULE, LIQUID FILLED ORAL
Refills: 0 | DISCHARGE

## 2023-12-20 RX ORDER — ESOMEPRAZOLE MAGNESIUM 40 MG/1
1 CAPSULE, DELAYED RELEASE ORAL
Refills: 0 | DISCHARGE

## 2023-12-20 RX ORDER — SODIUM CHLORIDE 9 MG/ML
500 INJECTION INTRAMUSCULAR; INTRAVENOUS; SUBCUTANEOUS
Refills: 0 | Status: DISCONTINUED | OUTPATIENT
Start: 2023-12-20 | End: 2023-12-20

## 2023-12-20 RX ORDER — SODIUM CHLORIDE 9 MG/ML
500 INJECTION INTRAMUSCULAR; INTRAVENOUS; SUBCUTANEOUS
Refills: 0 | Status: DISCONTINUED | OUTPATIENT
Start: 2023-12-20 | End: 2024-01-03

## 2023-12-20 RX ORDER — LATANOPROST 0.05 MG/ML
1 SOLUTION/ DROPS OPHTHALMIC; TOPICAL
Refills: 0 | DISCHARGE

## 2023-12-20 RX ORDER — ASPIRIN/CALCIUM CARB/MAGNESIUM 324 MG
1 TABLET ORAL
Qty: 30 | Refills: 11
Start: 2023-12-20 | End: 2024-12-13

## 2023-12-20 RX ORDER — HYDROCHLOROTHIAZIDE 25 MG
1 TABLET ORAL
Refills: 0 | DISCHARGE

## 2023-12-20 RX ORDER — ATORVASTATIN CALCIUM 80 MG/1
1 TABLET, FILM COATED ORAL
Refills: 0 | DISCHARGE

## 2023-12-20 RX ORDER — MAGNESIUM OXIDE 400 MG ORAL TABLET 241.3 MG
800 TABLET ORAL ONCE
Refills: 0 | Status: COMPLETED | OUTPATIENT
Start: 2023-12-20 | End: 2023-12-20

## 2023-12-20 RX ORDER — METFORMIN HYDROCHLORIDE 850 MG/1
1 TABLET ORAL
Refills: 0 | DISCHARGE

## 2023-12-20 RX ORDER — MAGNESIUM OXIDE 400 MG ORAL TABLET 241.3 MG
1 TABLET ORAL
Refills: 0 | DISCHARGE

## 2023-12-20 RX ORDER — METOPROLOL TARTRATE 50 MG
1 TABLET ORAL
Refills: 0 | DISCHARGE

## 2023-12-20 RX ORDER — BRINZOLAMIDE/BRIMONIDINE TARTRATE 10; 2 MG/ML; MG/ML
1 SUSPENSION/ DROPS OPHTHALMIC
Refills: 0 | DISCHARGE

## 2023-12-20 RX ORDER — ASPIRIN/CALCIUM CARB/MAGNESIUM 324 MG
1 TABLET ORAL
Refills: 0 | DISCHARGE

## 2023-12-20 RX ORDER — RIVAROXABAN 15 MG-20MG
1 KIT ORAL
Refills: 0 | DISCHARGE

## 2023-12-20 RX ORDER — LOSARTAN POTASSIUM 100 MG/1
1 TABLET, FILM COATED ORAL
Refills: 0 | DISCHARGE

## 2023-12-20 RX ORDER — ASPIRIN/CALCIUM CARB/MAGNESIUM 324 MG
81 TABLET ORAL ONCE
Refills: 0 | Status: COMPLETED | OUTPATIENT
Start: 2023-12-20 | End: 2023-12-20

## 2023-12-20 RX ORDER — TAMSULOSIN HYDROCHLORIDE 0.4 MG/1
1 CAPSULE ORAL
Refills: 0 | DISCHARGE

## 2023-12-20 RX ORDER — ACETAZOLAMIDE 250 MG/1
1 TABLET ORAL
Refills: 0 | DISCHARGE

## 2023-12-20 RX ADMIN — Medication 81 MILLIGRAM(S): at 07:58

## 2023-12-20 RX ADMIN — CLOPIDOGREL BISULFATE 600 MILLIGRAM(S): 75 TABLET, FILM COATED ORAL at 07:57

## 2023-12-20 RX ADMIN — Medication 40 MILLIEQUIVALENT(S): at 08:30

## 2023-12-20 RX ADMIN — SODIUM CHLORIDE 75 MILLILITER(S): 9 INJECTION INTRAMUSCULAR; INTRAVENOUS; SUBCUTANEOUS at 08:31

## 2023-12-20 RX ADMIN — MAGNESIUM OXIDE 400 MG ORAL TABLET 800 MILLIGRAM(S): 241.3 TABLET ORAL at 08:30

## 2023-12-20 RX ADMIN — SODIUM CHLORIDE 250 MILLILITER(S): 9 INJECTION INTRAMUSCULAR; INTRAVENOUS; SUBCUTANEOUS at 07:51

## 2023-12-20 NOTE — PROGRESS NOTE ADULT - SUBJECTIVE AND OBJECTIVE BOX
Interventional Cardiology PA Post PCI SDA Discharge Note      Patient without complaints. Ambulated and voided without difficulties    Afebrile, VSS    Ext: Right Radial : no hematoma, no bleeding, dressing; C/D/I      Pulses:    intact RAD to baseline     A/P:  s/p PCI: PTCA/CSI Atherectomy/Rotational Atherectomy/BMS/MARY ELLEN    1.	Follow-up with PMD/Cardiologist Dr. Uriarte in 72 hours.  2.                 Post procedure labs/EKG reviewed and stable.    3.                 Pt given instructions on importance of taking antiplatelet medication.    4. 	Stable for discharge as per attending  after bed rest, pt voids, groin/wrist stable and 30 minutes of ambulation.  5.                 Prescriptions for Aspirin/Plavix e-prescribed and submitted to patient's pharmacy.    6.                 Patient will continue Atorvastatin 40mg qhs.   7.	Discharge forms signed and copies in chart     Interventional Cardiology PA Post PCI SDA Discharge Note      Patient without complaints. Ambulated and voided without difficulties    Afebrile, VSS    Ext: Right Radial : no hematoma, no bleeding, dressing; C/D/I      Pulses:    intact RAD to baseline     A/P:  s/p PCI: PTCA/CSI Atherectomy/Rotational Atherectomy/BMS/AMRY ELLEN    1.	Follow-up with PMD/Cardiologist Dr. Uriarte in 72 hours.  2.                 Post procedure labs/EKG reviewed and stable.    3.                 Pt given instructions on importance of taking antiplatelet medication.    4. 	Stable for discharge as per attending  after bed rest, pt voids, groin/wrist stable and 30 minutes of ambulation.  5.                 Prescriptions for Aspirin/Plavix e-prescribed and submitted to patient's pharmacy.    6.                 Patient will continue Atorvastatin 40mg qhs.   7.	Discharge forms signed and copies in chart     Interventional Cardiology PA Post PCI SDA Discharge Note      Patient without complaints. Ambulated and voided without difficulties    Afebrile, VSS    Ext: Right Radial : no hematoma, no bleeding, dressing; C/D/I      Pulses:    intact RAD to baseline     A/P:  73 year old Mongolian/English speaking M with PMHx HTN, Hyperlipidemia, DM Type II, PVD (on Xarelto 2.5 mg PO BID-last dose 12/19/23), RBBB, Multilevel Spondylisis with left arm weakness, Bilateral Carotid Artery Stenosis (severe left and mdoerate right) s/p Left CEA 2/2017, CAD s/p 2V CABG (LIMA-LAD and SVG-OM1) ~ 2006 with subsequent PCI pLAD due to occlusion LIMA-LAD, SKYLER (on CPAP) who presented to [facility] cardiac cath lab for recommended cardiac cath with possible intervention if clinically indicated due to patient's risk factors, CCS Angina Class III Symptoms and abnormal NST.    Pt is now s/p cardiac cath on 12/20/23 with : Left dominant. S/p scoreflex + MARY ELLEN x1 oLAD 80-90% ISR, RCA small non dom, mod diffuse dz; LM 40%; pLCx 50% mild diffuse dz, oOM1 70%, OM2 mild disease, LPDA mild disease. LIMA known atretic. SVG-OM1 patent.   EDP 5 mmHg. Right ulnar access.     1.	Follow-up with PMD/Cardiologist Dr. Uriarte in 72 hours.  2.                 Post procedure labs/EKG reviewed and stable.    3.                 Pt given instructions on importance of taking antiplatelet medication.    4. 	Stable for discharge as per attending  after bed rest, pt voids, groin/wrist stable and 30 minutes of ambulation.  5.                 Prescriptions for Aspirin/Plavix e-prescribed and submitted to patient's pharmacy.    6.                 Patient will continue Atorvastatin 40mg qhs.   7.	Discharge forms signed and copies in chart     Interventional Cardiology PA Post PCI SDA Discharge Note      Patient without complaints. Ambulated and voided without difficulties    Afebrile, VSS    Ext: Right Radial : no hematoma, no bleeding, dressing; C/D/I      Pulses:    intact RAD to baseline     A/P:  73 year old Pashto/English speaking M with PMHx HTN, Hyperlipidemia, DM Type II, PVD (on Xarelto 2.5 mg PO BID-last dose 12/19/23), RBBB, Multilevel Spondylisis with left arm weakness, Bilateral Carotid Artery Stenosis (severe left and mdoerate right) s/p Left CEA 2/2017, CAD s/p 2V CABG (LIMA-LAD and SVG-OM1) ~ 2006 with subsequent PCI pLAD due to occlusion LIMA-LAD, SKYLER (on CPAP) who presented to [facility] cardiac cath lab for recommended cardiac cath with possible intervention if clinically indicated due to patient's risk factors, CCS Angina Class III Symptoms and abnormal NST.    Pt is now s/p cardiac cath on 12/20/23 with : Left dominant. S/p scoreflex + MARY ELLEN x1 oLAD 80-90% ISR, RCA small non dom, mod diffuse dz; LM 40%; pLCx 50% mild diffuse dz, oOM1 70%, OM2 mild disease, LPDA mild disease. LIMA known atretic. SVG-OM1 patent.   EDP 5 mmHg. Right ulnar access.     1.	Follow-up with PMD/Cardiologist Dr. Uriarte in 72 hours.  2.                 Post procedure labs/EKG reviewed and stable.    3.                 Pt given instructions on importance of taking antiplatelet medication.    4. 	Stable for discharge as per attending  after bed rest, pt voids, groin/wrist stable and 30 minutes of ambulation.  5.                 Prescriptions for Aspirin/Plavix e-prescribed and submitted to patient's pharmacy.    6.                 Patient will continue Atorvastatin 40mg qhs.   7.	Discharge forms signed and copies in chart     Interventional Cardiology PA Post PCI SDA Discharge Note      Patient without complaints. Ambulated and voided without difficulties    Afebrile, VSS    Ext: Right Radial : no hematoma, no bleeding, dressing; C/D/I      Pulses:    intact RAD to baseline     A/P:  73 year old Syriac/English speaking M with PMHx HTN, Hyperlipidemia, DM Type II, PVD (on Xarelto 2.5 mg), RBBB, Multilevel Spondylisis with left arm weakness, Bilateral Carotid Artery Stenosis (severe left and mdoerate right) s/p Left CEA 2/2017, CAD s/p 2V CABG (LIMA-LAD and SVG-OM1) ~ 2006 with subsequent PCI pLAD due to occlusion LIMA-LAD, SKYLER (on CPAP) who presented to [facility] cardiac cath lab for recommended cardiac cath with possible intervention if clinically indicated due to patient's risk factors, CCS Angina Class III Symptoms and abnormal NST.    Pt is now s/p cardiac cath on 12/20/23 with : Left dominant. S/p scoreflex + MARY ELLEN x1 oLAD 80-90% ISR, RCA small non dom, mod diffuse dz; LM 40%; pLCx 50% mild diffuse dz, oOM1 70%, OM2 mild disease, LPDA mild disease. LIMA known atretic. SVG-OM1 patent.   EDP 5 mmHg. Right ulnar access.     1.	Follow-up with PMD/Cardiologist Dr. Uriarte in 72 hours.  2.                 Post procedure labs/EKG reviewed and stable.    3.                 Pt given instructions on importance of taking antiplatelet medication.    4. 	Stable for discharge as per attending  after bed rest, pt voids, groin/wrist stable and 30 minutes of ambulation.  5.          Prescriptions for Aspirin/Plavix e-prescribed and submitted to pt's pharmacy.  Ok to continue Xarelto 2.5mg PO BID in addition to DAPT per IC.   6.                 Patient will continue Atorvastatin 40mg qhs.   7.	Discharge forms signed and copies in chart     Interventional Cardiology PA Post PCI SDA Discharge Note      Patient without complaints. Ambulated and voided without difficulties    Afebrile, VSS    Ext: Right Radial : no hematoma, no bleeding, dressing; C/D/I      Pulses:    intact RAD to baseline     A/P:  73 year old German/English speaking M with PMHx HTN, Hyperlipidemia, DM Type II, PVD (on Xarelto 2.5 mg), RBBB, Multilevel Spondylisis with left arm weakness, Bilateral Carotid Artery Stenosis (severe left and mdoerate right) s/p Left CEA 2/2017, CAD s/p 2V CABG (LIMA-LAD and SVG-OM1) ~ 2006 with subsequent PCI pLAD due to occlusion LIMA-LAD, SKYLER (on CPAP) who presented to [facility] cardiac cath lab for recommended cardiac cath with possible intervention if clinically indicated due to patient's risk factors, CCS Angina Class III Symptoms and abnormal NST.    Pt is now s/p cardiac cath on 12/20/23 with : Left dominant. S/p scoreflex + MARY ELLEN x1 oLAD 80-90% ISR, RCA small non dom, mod diffuse dz; LM 40%; pLCx 50% mild diffuse dz, oOM1 70%, OM2 mild disease, LPDA mild disease. LIMA known atretic. SVG-OM1 patent.   EDP 5 mmHg. Right ulnar access.     1.	Follow-up with PMD/Cardiologist Dr. Uriarte in 72 hours.  2.                 Post procedure labs/EKG reviewed and stable.    3.                 Pt given instructions on importance of taking antiplatelet medication.    4. 	Stable for discharge as per attending  after bed rest, pt voids, groin/wrist stable and 30 minutes of ambulation.  5.          Prescriptions for Aspirin/Plavix e-prescribed and submitted to pt's pharmacy.  Ok to continue Xarelto 2.5mg PO BID in addition to DAPT per IC.   6.                 Patient will continue Atorvastatin 40mg qhs.   7.	Discharge forms signed and copies in chart

## 2023-12-21 LAB
POCT ISTAT CREATININE: 1.2 MG/DL — SIGNIFICANT CHANGE UP (ref 0.5–1.3)
POCT ISTAT CREATININE: 1.2 MG/DL — SIGNIFICANT CHANGE UP (ref 0.5–1.3)

## 2023-12-29 DIAGNOSIS — I25.84 CORONARY ATHEROSCLEROSIS DUE TO CALCIFIED CORONARY LESION: ICD-10-CM

## 2023-12-29 DIAGNOSIS — I25.110 ATHEROSCLEROTIC HEART DISEASE OF NATIVE CORONARY ARTERY WITH UNSTABLE ANGINA PECTORIS: ICD-10-CM

## 2023-12-29 DIAGNOSIS — R94.39 ABNORMAL RESULT OF OTHER CARDIOVASCULAR FUNCTION STUDY: ICD-10-CM

## 2024-01-08 ENCOUNTER — APPOINTMENT (OUTPATIENT)
Dept: UROLOGY | Facility: CLINIC | Age: 74
End: 2024-01-08

## 2024-02-05 ENCOUNTER — APPOINTMENT (OUTPATIENT)
Dept: OPHTHALMOLOGY | Facility: CLINIC | Age: 74
End: 2024-02-05

## 2024-11-08 ENCOUNTER — APPOINTMENT (OUTPATIENT)
Dept: UROLOGY | Facility: CLINIC | Age: 74
End: 2024-11-08
Payer: MEDICARE

## 2024-11-08 VITALS
SYSTOLIC BLOOD PRESSURE: 124 MMHG | BODY MASS INDEX: 23.44 KG/M2 | HEART RATE: 57 BPM | WEIGHT: 129 LBS | DIASTOLIC BLOOD PRESSURE: 71 MMHG | OXYGEN SATURATION: 96 % | RESPIRATION RATE: 15 BRPM | TEMPERATURE: 97.9 F | HEIGHT: 62.2 IN

## 2024-11-08 PROCEDURE — 99204 OFFICE O/P NEW MOD 45 MIN: CPT

## 2024-11-08 PROCEDURE — G2211 COMPLEX E/M VISIT ADD ON: CPT

## 2024-11-08 RX ORDER — TAMSULOSIN HYDROCHLORIDE 0.4 MG/1
0.4 CAPSULE ORAL
Qty: 90 | Refills: 3 | Status: ACTIVE | COMMUNITY
Start: 2024-11-08 | End: 1900-01-01

## 2024-11-12 LAB — URINE CYTOLOGY: NORMAL

## 2024-11-15 ENCOUNTER — APPOINTMENT (OUTPATIENT)
Dept: UROLOGY | Facility: CLINIC | Age: 74
End: 2024-11-15
Payer: MEDICARE

## 2024-11-15 VITALS
HEART RATE: 68 BPM | OXYGEN SATURATION: 97 % | HEIGHT: 64 IN | WEIGHT: 135 LBS | BODY MASS INDEX: 23.05 KG/M2 | DIASTOLIC BLOOD PRESSURE: 76 MMHG | RESPIRATION RATE: 14 BRPM | TEMPERATURE: 97.2 F | SYSTOLIC BLOOD PRESSURE: 144 MMHG

## 2024-11-15 DIAGNOSIS — R31.0 GROSS HEMATURIA: ICD-10-CM

## 2024-11-15 PROCEDURE — 52214Z: CUSTOM

## 2024-11-15 PROCEDURE — 99213 OFFICE O/P EST LOW 20 MIN: CPT | Mod: 25

## 2024-11-16 PROBLEM — R31.0 GROSS HEMATURIA: Status: ACTIVE | Noted: 2024-11-08

## 2024-12-16 ENCOUNTER — NON-APPOINTMENT (OUTPATIENT)
Age: 74
End: 2024-12-16

## 2024-12-16 ENCOUNTER — APPOINTMENT (OUTPATIENT)
Dept: UROLOGY | Facility: CLINIC | Age: 74
End: 2024-12-16
Payer: MEDICARE

## 2024-12-16 VITALS
TEMPERATURE: 97.9 F | OXYGEN SATURATION: 98 % | HEART RATE: 54 BPM | RESPIRATION RATE: 14 BRPM | WEIGHT: 135 LBS | DIASTOLIC BLOOD PRESSURE: 50 MMHG | HEIGHT: 64 IN | SYSTOLIC BLOOD PRESSURE: 124 MMHG | BODY MASS INDEX: 23.05 KG/M2

## 2024-12-16 DIAGNOSIS — D35.00 BENIGN NEOPLASM OF UNSPECIFIED ADRENAL GLAND: ICD-10-CM

## 2024-12-16 DIAGNOSIS — R31.0 GROSS HEMATURIA: ICD-10-CM

## 2024-12-16 DIAGNOSIS — R39.9 UNSPECIFIED SYMPTOMS AND SIGNS INVOLVING THE GENITOURINARY SYSTEM: ICD-10-CM

## 2024-12-16 DIAGNOSIS — Z12.5 ENCOUNTER FOR SCREENING FOR MALIGNANT NEOPLASM OF PROSTATE: ICD-10-CM

## 2024-12-16 PROCEDURE — 52000 CYSTOURETHROSCOPY: CPT

## 2024-12-16 PROCEDURE — 99214 OFFICE O/P EST MOD 30 MIN: CPT | Mod: 25

## 2024-12-17 PROBLEM — R39.9 LOWER URINARY TRACT SYMPTOMS (LUTS): Status: ACTIVE | Noted: 2024-12-17

## 2024-12-17 PROBLEM — D35.00 ADRENAL ADENOMA: Status: ACTIVE | Noted: 2024-12-17

## 2024-12-17 LAB
ALDOSTERONE SERUM: 16.7 NG/DL
PSA FREE FLD-MCNC: 32 %
PSA FREE SERPL-MCNC: 0.22 NG/ML
PSA SERPL-MCNC: 0.68 NG/ML
URINE CYTOLOGY: NORMAL

## 2024-12-18 LAB — BACTERIA UR CULT: NORMAL

## 2024-12-23 LAB
CORTIS SAL-MCNC: NORMAL
METANEPHRINE, PL: 47.5 PG/ML
NORMETANEPHRINE, PL: 124.3 PG/ML
RENIN ACTIVITY, PLASMA: 0.46 NG/ML/HR

## 2025-05-28 ENCOUNTER — NON-APPOINTMENT (OUTPATIENT)
Age: 75
End: 2025-05-28

## 2025-05-28 ENCOUNTER — APPOINTMENT (OUTPATIENT)
Age: 75
End: 2025-05-28
Payer: MEDICARE

## 2025-05-28 VITALS
SYSTOLIC BLOOD PRESSURE: 189 MMHG | RESPIRATION RATE: 14 BRPM | BODY MASS INDEX: 23.05 KG/M2 | OXYGEN SATURATION: 94 % | WEIGHT: 135 LBS | DIASTOLIC BLOOD PRESSURE: 70 MMHG | HEIGHT: 64 IN | TEMPERATURE: 97.6 F | HEART RATE: 58 BPM

## 2025-05-28 DIAGNOSIS — R31.0 GROSS HEMATURIA: ICD-10-CM

## 2025-05-28 DIAGNOSIS — D35.00 BENIGN NEOPLASM OF UNSPECIFIED ADRENAL GLAND: ICD-10-CM

## 2025-05-28 PROCEDURE — 99214 OFFICE O/P EST MOD 30 MIN: CPT

## 2025-05-28 PROCEDURE — G2211 COMPLEX E/M VISIT ADD ON: CPT

## 2025-05-29 LAB
APPEARANCE: CLEAR
BACTERIA: NEGATIVE /HPF
BILIRUBIN URINE: NEGATIVE
BLOOD URINE: NEGATIVE
CAST: 0 /LPF
COLOR: YELLOW
EPITHELIAL CELLS: 0 /HPF
GLUCOSE QUALITATIVE U: NEGATIVE MG/DL
KETONES URINE: NEGATIVE MG/DL
LEUKOCYTE ESTERASE URINE: ABNORMAL
MICROSCOPIC-UA: NORMAL
NITRITE URINE: NEGATIVE
PH URINE: 6.5
PROTEIN URINE: NORMAL MG/DL
RED BLOOD CELLS URINE: 0 /HPF
SPECIFIC GRAVITY URINE: 1.01
URINE CYTOLOGY: NORMAL
UROBILINOGEN URINE: 0.2 MG/DL
WHITE BLOOD CELLS URINE: 0 /HPF

## 2025-05-31 LAB — BACTERIA UR CULT: NORMAL

## 2025-06-13 ENCOUNTER — APPOINTMENT (OUTPATIENT)
Dept: UROLOGY | Facility: CLINIC | Age: 75
End: 2025-06-13
Payer: MEDICARE

## 2025-06-13 VITALS
SYSTOLIC BLOOD PRESSURE: 166 MMHG | DIASTOLIC BLOOD PRESSURE: 76 MMHG | HEIGHT: 64 IN | OXYGEN SATURATION: 98 % | TEMPERATURE: 97.7 F | RESPIRATION RATE: 15 BRPM | HEART RATE: 65 BPM | WEIGHT: 135 LBS | BODY MASS INDEX: 23.05 KG/M2

## 2025-06-13 PROCEDURE — 52000 CYSTOURETHROSCOPY: CPT

## 2025-06-13 PROCEDURE — 99213 OFFICE O/P EST LOW 20 MIN: CPT | Mod: 25

## 2025-06-14 LAB
APPEARANCE: CLEAR
BACTERIA: NEGATIVE /HPF
BILIRUBIN URINE: NEGATIVE
BLOOD URINE: NEGATIVE
CAST: 3 /LPF
COLOR: YELLOW
EPITHELIAL CELLS: 1 /HPF
GLUCOSE QUALITATIVE U: NEGATIVE MG/DL
KETONES URINE: NEGATIVE MG/DL
LEUKOCYTE ESTERASE URINE: NEGATIVE
MICROSCOPIC-UA: NORMAL
NITRITE URINE: NEGATIVE
PH URINE: 6
PROTEIN URINE: 100 MG/DL
RED BLOOD CELLS URINE: 1 /HPF
SPECIFIC GRAVITY URINE: 1.01
UROBILINOGEN URINE: 0.2 MG/DL
WHITE BLOOD CELLS URINE: 1 /HPF

## 2025-06-21 LAB
BACTERIA UR CULT: ABNORMAL
URINE CYTOLOGY: NORMAL